# Patient Record
Sex: FEMALE | Race: WHITE
[De-identification: names, ages, dates, MRNs, and addresses within clinical notes are randomized per-mention and may not be internally consistent; named-entity substitution may affect disease eponyms.]

---

## 2017-11-07 NOTE — EDM.PDOC
ED HPI GENERAL MEDICAL PROBLEM





- General


Chief Complaint: General


Stated Complaint: incision open


Time Seen by Provider: 11/07/17 19:35


Source of Information: Reports: Patient


History Limitations: Reports: No Limitations





- History of Present Illness


INITIAL COMMENTS - FREE TEXT/NARRATIVE: 





Patient presents with concerns of her abdominal incision that "popped open and 

it now bleeding".  Patient had a laparoscope yesterday to remove her ovaries 

and her tubes.  Was taking a shower this evening and drying off when felt the 

popping sensation.  Did have a friend who is a nurse observe the open wound and 

did try steri strips but they wouldn't stick.  She is having mild discomfort 

but not necessarily more than her post-surgical pain.  


Onset: Today, Sudden


Duration: Hour(s):


Location: Reports: Abdomen


Quality: Reports: Dull


Severity: Mild


Associated Symptoms: Reports: No Other Symptoms


  ** Lower Back


Pain Score (Numeric/FACES): 2





- Related Data


 Allergies











Allergy/AdvReac Type Severity Reaction Status Date / Time


 


morphine Allergy  Hives Verified 11/07/17 19:13


 


Sulfa (Sulfonamide Allergy  Hives Verified 11/07/17 19:13





Antibiotics)     











Home Meds: 


 Home Meds





ALPRAZolam [Alprazolam] 0.25 mg PO BEDTIME 11/07/17 [History]


Acetaminophen/HYDROcodone [Norco 325-5 MG] 1 tab PO Q4H PRN 11/07/17 [History]


Estradiol [Estradiol] 1 tab PO DAILY 11/07/17 [History]


Venlafaxine HCl [Venlafaxine ER] 150 mg PO BEDTIME 11/07/17 [History]











Past Medical History


Gastrointestinal History: Reports: GERD, Irritable Bowel Syndrome, PUD, Other (

See Below)


Other Gastrointestinal History: liver reflux


Genitourinary History: Reports: None


OB/GYN History: Reports: Pregnancy


Musculoskeletal History: Reports: Fracture, Neck Pain, Chronic


Neurological History: Reports: Migraines


Psychiatric History: Reports: Anxiety, Depression


Hematologic History: Reports: Anemia


Dermatologic History: Reports: Psoriasis





- Past Surgical History


GI Surgical History: Reports: Cholecystectomy, Colonoscopy, EGD


Female  Surgical History: Reports: Hysterectomy


Neurological Surgical History: Reports: None


Musculoskeletal Surgical History: Reports: ORIF





Social & Family History





- Family History


Family Medical History: Noncontributory





- Tobacco Use


Smoking Status *Q: Former Smoker


Used Tobacco, but Quit: Yes


Month Tobacco Last Used: 13 yrs





- Recreational Drug Use


Recreational Drug Use: No





ED ROS GENERAL





- Review of Systems


Review Of Systems: See Below


Constitutional: Denies: Fever, Chills, Malaise, Decreased Appetite


HEENT: Reports: No Symptoms


Respiratory: Reports: No Symptoms


Cardiovascular: Reports: No Symptoms


Endocrine: Reports: No Symptoms


GI/Abdominal: Reports: Abdominal Pain


: Reports: No Symptoms


Musculoskeletal: Reports: No Symptoms


Skin: Reports: Other (  )





ED EXAM, GENERAL





- Physical Exam


Exam: See Below


Exam Limited By: No Limitations


General Appearance: Alert, WD/WN, No Apparent Distress


GI/Abdominal: Normal Bowel Sounds, Soft, Tender


Neurological: Alert, Oriented


Skin Exam: Warm, Dry, Other (umbilical incision is now gaping open, 

approximately 2 cm by 1.5 cm open.  Did opt to close wound for better healing 

with one suture.  See procedure.)





ED GENERAL MEDICAL PROCEDURES





- Laceration/Wound Repair


  ** Abdomen


Lac/wound length in cm: 2


Appearance: Linear


Anesthetic Type: Local


Local Anesthesia - Lidocaine (Xylocaine): 1% with EPI


Local Anesthetic Volume: 2cc


Skin Prep: Providone-Iodine (Betadine)


Exploration/Debridement/Repair: Wound Explored (incision explored, tissue 

exposed but clean.)


Closed with: Sutures (approximate the edges with one suture)


# of Sutures: 1


Complications: No





Course





- Vital Signs


Last Recorded V/S: 


 Last Vital Signs











Temp  98.1 F   11/07/17 19:10


 


Pulse  87   11/07/17 19:10


 


Resp  16   11/07/17 19:10


 


BP  120/81   11/07/17 19:10


 


Pulse Ox  96   11/07/17 19:10














- Orders/Labs/Meds


Meds: 


Medications














Discontinued Medications














Generic Name Dose Route Start Last Admin





  Trade Name Angie  PRN Reason Stop Dose Admin


 


Lidocaine/Epinephrine  20 ml  11/07/17 19:46  11/07/17 19:46





  Xylocaine 1% With Epinephrine 1:100,000  INJECT  11/07/17 19:47  20 ml





  ONETIME ONE   Administration


 


Lidocaine/Epinephrine  Confirm  11/07/17 19:36  11/07/17 19:56





  Xylocaine 1% With Epinephrine 1:100,000  Administered  11/07/17 19:37  Not 

Given





  Dose   





  20 ml   





  .ROUTE   





  .STK-MED ONE   














Departure





- Departure


Time of Disposition: 20:00


Disposition: Home, Self-Care 01


Condition: Good


Clinical Impression: 


 Wound dehiscence








- Discharge Information


Referrals: 


Kristel Villanueva PA [Emergency Provider] - 


Forms:  ED Department Discharge


Additional Instructions: 


1.  Keep wound clean and dry


2.  Change bandage as needed


3.  Have suture removed at OB/GYN visit


4.  Contact us with any questions

## 2019-08-31 ENCOUNTER — HOSPITAL ENCOUNTER (EMERGENCY)
Dept: HOSPITAL 38 - CC.ED | Age: 49
Discharge: HOME | End: 2019-08-31
Payer: COMMERCIAL

## 2019-08-31 DIAGNOSIS — Z88.5: ICD-10-CM

## 2019-08-31 DIAGNOSIS — Z88.2: ICD-10-CM

## 2019-08-31 DIAGNOSIS — N28.9: Primary | ICD-10-CM

## 2019-08-31 LAB
CHLORIDE SERPL-SCNC: 105 MEQ/L (ref 98–106)
SODIUM SERPL-SCNC: 143 MEQ/L (ref 136–145)

## 2019-08-31 NOTE — EDM.PDOC
ED HPI GENERAL MEDICAL PROBLEM





- General


Chief Complaint: General


Stated Complaint: sweating,weakness


Time Seen by Provider: 08/31/19 20:55


Source of Information: Reports: Patient


History Limitations: Reports: No Limitations





- History of Present Illness


INITIAL COMMENTS - FREE TEXT/NARRATIVE: 





This patient is a 49 year old female that presents to the ER. Patient reports 

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 She 

reports towards the end of the evening, she took a bite of whip from a bowl. 

She reports shortly after doing that she felt lightheaded, diaphoretic, hot 

flushed, lower abdominal cramping like she had to have a BM. She reports that 

The patient reports that she has been getting migraines frequently, has chronic 

history of migraines. The patient denies n, v, d, f, chest pain, shortness of 

breath, urinary changes, unilateral weaknesses. 


Onset: Today


Onset Date: 08/31/19


Onset Time: 20:00


Duration: Hour(s): (1)


Location: Reports: Abdomen


Quality: Reports: Other (cramping)


Severity: Moderate


Improves with: Reports: None


Worsens with: Reports: None


Associated Symptoms: Reports: Diaphoresis, Headaches.  Denies: Confusion, Chest 

Pain, Cough, cough w sputum, Fever/Chills, Loss of Appetite, Malaise, Nausea/

Vomiting, Rash, Seizure, Shortness of Breath, Syncope, Weakness


  ** Lower Abdominal


Pain Score (Numeric/FACES): 8





- Related Data


 Allergies











Allergy/AdvReac Type Severity Reaction Status Date / Time


 


morphine Allergy  Hives Verified 08/31/19 20:43


 


Sulfa (Sulfonamide Allergy  Hives Verified 08/31/19 20:43





Antibiotics)     











Home Meds: 


 Home Meds





5Hydroxytryptophan(Oxitriptan) [5-Htp] 200 mg PO DAILY 08/31/19 [History]


Estradiol 0.025 mg TOP DAILY 08/31/19 [History]


Fluocinonide 1 applic TOP DAILY 08/31/19 [History]


Multivit-Min/Iron/Folic/Pdr374 [Hair, Skin and Nails Tablet] 1 tab PO DAILY 08/ 31/19 [History]











Past Medical History


Gastrointestinal History: Reports: GERD, Irritable Bowel Syndrome, PUD, Other (

See Below)


Other Gastrointestinal History: liver reflux


Genitourinary History: Reports: None


OB/GYN History: Reports: Pregnancy


Musculoskeletal History: Reports: Fracture, Neck Pain, Chronic


Neurological History: Reports: Migraines


Psychiatric History: Reports: Anxiety, Depression


Hematologic History: Reports: Anemia


Dermatologic History: Reports: Psoriasis





- Past Surgical History


GI Surgical History: Reports: Cholecystectomy, Colonoscopy, EGD


Female  Surgical History: Reports: Hysterectomy


Neurological Surgical History: Reports: None


Musculoskeletal Surgical History: Reports: ORIF





Social & Family History





- Family History


Family Medical History: Noncontributory





ED ROS GENERAL





- Review of Systems


Review Of Systems: See Below


Constitutional: Reports: Diaphoresis.  Denies: Fever


HEENT: Reports: No Symptoms


Respiratory: Reports: No Symptoms.  Denies: Shortness of Breath


Cardiovascular: Reports: Lightheadedness.  Denies: Chest Pain, Blood Pressure 

Problem, Claudication, Dyspnea on Exertion, Edema, Orthopnea, Palpitations, 

Syncope


Endocrine: Reports: No Symptoms


GI/Abdominal: Reports: Abdominal Pain.  Denies: Diarrhea, Nausea, Vomiting


: Reports: No Symptoms


Musculoskeletal: Reports: No Symptoms


Skin: Reports: No Symptoms


Neurological: Reports: Headache.  Denies: Seizure, Syncope


Psychiatric: Reports: No Symptoms


Hematologic/Lymphatic: Reports: No Symptoms


Immunologic: Reports: No Symptoms





ED EXAM, GENERAL





- Physical Exam


Exam: See Below


Exam Limited By: No Limitations


General Appearance: Alert, WD/WN, No Apparent Distress, Anxious


Eye Exam: Bilateral Eye: Normal Inspection, PERRL


Ears: Normal External Exam, Normal Canal, Hearing Grossly Normal, Normal TMs


Ear Exam: Bilateral Ear: Auricle Normal, Canal Normal, TM normal


Nose: Normal Inspection, Normal Mucosa, No Blood


Throat/Mouth: Normal Inspection, Normal Lips, Normal Teeth, Normal Gums, Normal 

Oropharynx, Normal Voice, No Airway Compromise


Head: Atraumatic, Normocephalic


Neck: Normal Inspection, Supple, Non-Tender, Full Range of Motion


Respiratory/Chest: No Respiratory Distress, Lungs Clear, Normal Breath Sounds, 

No Accessory Muscle Use


Cardiovascular: Normal Peripheral Pulses, Regular Rate, Rhythm, No Edema, No 

Gallop, No JVD, No Murmur, No Rub


Peripheral Pulses: 2+: Carotid (L), Carotid (R), Radial (L), Radial (R), 

Posterior Tibial (L), Posterior Tibial (R)


GI/Abdominal: Normal Bowel Sounds, Soft, No Organomegaly, No Distention, No 

Abnormal Bruit, No Mass, Pelvis Stable, Tender (mild RLQ, LLQ.).  No: Distended

, Guarding, Rigid, Rebound


 (Female) Exam: Deferred


Rectal (Female) Exam: Deferred


Back Exam: Normal Inspection, Full Range of Motion.  No: CVA Tenderness (L), 

CVA Tenderness (R)


Extremities: Normal Inspection, Normal Range of Motion, Non-Tender, No Pedal 

Edema, Normal Capillary Refill


Neurological: Alert, Oriented, CN II-XII Intact, Normal Cognition, Normal Gait, 

No Motor/Sensory Deficits


Psychiatric: Normal Affect, Normal Mood


Skin Exam: Warm, Dry, Intact, Normal Color, No Rash


Lymphatic: No Adenopathy





EKG INTERPRETATION


EKG Date: 08/31/19


Time: 21:32


Rhythm: NSR


Rate (Beats/Min): 79


QRS: Normal


ST-T: Other (nonspecific. no elevation)


QT: Normal


Comparison: NA - No Prior EKG





Course





- Vital Signs


Last Recorded V/S: 


 Last Vital Signs











Temp  96.8 F   08/31/19 20:43


 


Pulse  96   08/31/19 20:43


 


Resp  18   08/31/19 20:43


 


BP  132/73   08/31/19 20:43


 


Pulse Ox  100   08/31/19 20:43














- Orders/Labs/Meds


Orders: 


 Active Orders 24 hr











 Category Date Time Status


 


 EKG Documentation Completion [RC] STAT Care  08/31/19 21:02 Inactive


 


 Abdomen Pelvis w Cont [CT] Stat Exams  08/31/19 21:50 Taken











Labs: 


 Laboratory Tests











  08/31/19 08/31/19 08/31/19 Range/Units





  21:25 21:25 21:52 


 


WBC  11.6 H    (5.0-10.0)  10^3/uL


 


RBC  4.06    (4.00-5.50)  10^6/uL


 


Hgb  11.9 L    (12.0-16.0)  g/dL


 


Hct  36.4 L    (37.0-47.0)  %


 


MCV  89.7    (82.0-94.0)  fL


 


MCH  29.3    (27.0-32.0)  pg


 


MCHC  32.7 L    (33.0-38.0)  g/dL


 


RDW Coeff of Zena  13.2    (11.0-15.0)  %


 


Plt Count  437 H    (150-400)  10^3/uL


 


Neut % (Auto)  59.0    (35-85)  %


 


Lymph % (Auto)  29.6    (10-55)  %


 


Mono % (Auto)  9.7    (0-16)  %


 


Eos % (Auto)  0.8    (0-5)  %


 


Baso % (Auto)  0.9    (0-3)  %


 


Neut # (Auto)  6.87    (1.80-7.00)  10^3/uL


 


Lymph # (Auto)  3.45    (1.00-4.80)  10^3/uL


 


Mono # (Auto)  1.13 H    (0.00-0.80)  10^3/uL


 


Eos # (Auto)  0.09    (0.00-0.45)  10^3/uL


 


Baso # (Auto)  0.10    10^3/uL


 


Sodium   143   (136-145)  mEq/L


 


Potassium   4.4   (3.5-5.0)  mEq/L


 


Chloride   105   ()  mEq/L


 


Carbon Dioxide   31   (21-32)  mmol/L


 


BUN   14   (7-18)  mg/dL


 


Creatinine   1.2 H   (0.6-1.0)  mg/dL


 


Est Cr Clr Drug Dosing   45.88   mL/min


 


Estimated GFR (MDRD)   47 L   (>=60)  mL/min


 


Glucose   107 H   (75-99)  mg/dL


 


Calcium   9.6   (8.4-10.1)  mg/dL


 


Total Bilirubin   0.5   (0.0-1.0)  mg/dL


 


AST   22   (15-37)  U/L


 


ALT   27   (12-78)  U/L


 


Alkaline Phosphatase   87   ()  U/L


 


Lactate Dehydrogenase   190   (100-190)  U/L


 


Creatine Kinase   135   ()  U/L


 


Troponin I   < 0.017   (0.00-0.06)  ng/mL


 


Total Protein   7.3   (6.4-8.2)  g/dL


 


Albumin   3.9   (3.4-5.0)  g/dL


 


Urine Color    Yellow  (YELLOW)  


 


Urine Appearance    Clear  (CLEAR)  


 


Urine pH    8.5 H  (4.5-8.0)  


 


Ur Specific Gravity    1.020  (1.003-1.020)  


 


Urine Protein    30 H  (NEGATIVE)  mg/dL


 


Urine Glucose (UA)    Negative  (NEGATIVE)  mg/dL


 


Urine Ketones    15 H  (NEGATIVE)  mg/dL


 


Urine Occult Blood    Negative  (NEGATIVE)  


 


Urine Nitrite    Negative  (NEGATIVE)  


 


Urine Bilirubin    Negative  (NEGATIVE)  


 


Urine Urobilinogen    0.2  (0.2-1.0)  EU/dL


 


Ur Leukocyte Esterase    Negative  (NEGATIVE)  


 


Urine RBC    Not seen  (0-5)  /HPF


 


Urine WBC    0-5  (0-5)  /HPF


 


Ur Squamous Epith Cells    Few H  (NOT SEEN)  /HPF


 


Amorphous Sediment    Moderate H  (NOT SEEN)  /HPF


 


Urine Bacteria    Occasional H  (NOT SEEN)  /HPF


 


Urine Mucus    Few H  (NOT SEEN)  /HPF











Meds: 


Medications














Discontinued Medications














Generic Name Dose Route Start Last Admin





  Trade Name Freq  PRN Reason Stop Dose Admin


 


Sodium Chloride  1,000 mls @ 1,000 mls/hr  08/31/19 21:02  08/31/19 21:21





  Normal Saline  IV  08/31/19 22:01  1,000 mls/hr





  .BOLUS ONE   Administration





     





     





     





     


 


Iopamidol  100 ml  08/31/19 21:53  08/31/19 22:09





  Isovue-370 (76%)  IVPUSH  08/31/19 21:54  100 ml





  ONETIME ONE   Administration





     





     





     





     














- Radiology Interpretation


Free Text/Narrative:: 





CT ABD/Pelvis with contrast: Discussed with radiologist: Negative exam. 


CT Results Date: 08/31/19


CT Results Time: 23:05





- Re-Assessments/Exams


Free Text/Narrative Re-Assessment/Exam: 





08/31/19 21:52


Due to patient wbc elevation and abd pain with diaphoretic, will ct to r/o 

appendicitis. 





Departure





- Departure


Time of Disposition: 23:06


Disposition: Home, Self-Care 01


Condition: Fair


Clinical Impression: 


 Renal insufficiency








- Discharge Information


*PRESCRIPTION DRUG MONITORING PROGRAM REVIEWED*: Not Applicable


*COPY OF PRESCRIPTION DRUG MONITORING REPORT IN PATIENT ROD: Not Applicable


Instructions:  Dehydration, Adult, Easy-to-Read


Referrals: 


Kristel Villanueva PA [Primary Care Provider] - 


Forms:  ED Department Discharge


Additional Instructions: 


Followup with your primary care provider


Return to the ER for worsening of conditions or any emergent concerns


Increase fluids


Go home and rest








- My Orders


Last 24 Hours: 


My Active Orders





08/31/19 21:02


EKG Documentation Completion [RC] STAT 





08/31/19 21:50


Abdomen Pelvis w Cont [CT] Stat 














- Assessment/Plan


Last 24 Hours: 


My Active Orders





08/31/19 21:02


EKG Documentation Completion [RC] STAT 





08/31/19 21:50


Abdomen Pelvis w Cont [CT] Stat 











Plan: 





PLEASE SEE RN NOTE FOR PFSH.